# Patient Record
Sex: FEMALE | Race: WHITE
[De-identification: names, ages, dates, MRNs, and addresses within clinical notes are randomized per-mention and may not be internally consistent; named-entity substitution may affect disease eponyms.]

---

## 2019-04-02 VITALS — DIASTOLIC BLOOD PRESSURE: 67 MMHG | SYSTOLIC BLOOD PRESSURE: 144 MMHG

## 2021-04-15 ENCOUNTER — HOSPITAL ENCOUNTER (OUTPATIENT)
Dept: HOSPITAL 61 - RAD | Age: 71
End: 2021-04-15
Attending: FAMILY MEDICINE
Payer: MEDICARE

## 2021-04-15 DIAGNOSIS — M48.061: ICD-10-CM

## 2021-04-15 DIAGNOSIS — M47.26: Primary | ICD-10-CM

## 2021-04-15 DIAGNOSIS — M43.16: ICD-10-CM

## 2021-04-15 PROCEDURE — 72110 X-RAY EXAM L-2 SPINE 4/>VWS: CPT

## 2021-04-16 NOTE — RAD
XR LUMBAR SPINE 4+V



History: Reason: RADICULOPATHY, SPINAL STENOSIS / Spl. Instructions:  / History: 



Technique: 5 views lumbar spine.



Comparison: April 18, 2017



Findings:

Increased leftward curvature of the lumbar spine. Posterior stabilization L4-L5 and interbody fusion.
 Grade 1 anterolisthesis L4 on L5, unchanged. Mild retrolisthesis L3 on L4, unchanged. Mild retrolist
hesis L1 on L2, increased. Grade 1 anterolisthesis L5 on S1, increased. Advanced degenerative disc ch
anges, increased compared to prior. Advanced facet arthropathy, increased. Surgical clips right upper
 quadrant. No acute fracture.



Impression: 

1.  Advanced multilevel lumbar spondylosis with leftward curvature, progressed compared to 2017.

2.  Increased grade 1 anterolisthesis L5 on S1 and retrolisthesis L1 on L2.

3.  Posterior stabilization and interbody fusion L4-L5.



Electronically signed by: Zhen Pate DO (4/16/2021 8:23 AM) [id number]